# Patient Record
Sex: FEMALE | Race: WHITE | NOT HISPANIC OR LATINO
[De-identification: names, ages, dates, MRNs, and addresses within clinical notes are randomized per-mention and may not be internally consistent; named-entity substitution may affect disease eponyms.]

---

## 2022-09-21 ENCOUNTER — APPOINTMENT (OUTPATIENT)
Dept: SURGICAL ONCOLOGY | Facility: CLINIC | Age: 52
End: 2022-09-21

## 2022-09-21 ENCOUNTER — NON-APPOINTMENT (OUTPATIENT)
Age: 52
End: 2022-09-21

## 2022-09-21 DIAGNOSIS — N63.10 UNSPECIFIED LUMP IN THE RIGHT BREAST, UNSPECIFIED QUADRANT: ICD-10-CM

## 2022-09-21 PROBLEM — Z00.00 ENCOUNTER FOR PREVENTIVE HEALTH EXAMINATION: Status: ACTIVE | Noted: 2022-09-21

## 2022-09-21 PROCEDURE — 99204 OFFICE O/P NEW MOD 45 MIN: CPT | Mod: 95

## 2022-09-21 NOTE — PHYSICAL EXAM
[Normal] : supple, no neck mass and thyroid not enlarged [Normal Neck Lymph Nodes] : normal neck lymph nodes  [Normal Supraclavicular Lymph Nodes] : normal supraclavicular lymph nodes [Normal Groin Lymph Nodes] : normal groin lymph nodes [Normal Axillary Lymph Nodes] : normal axillary lymph nodes [Normal] : oriented to person, place and time, with appropriate affect [de-identified] : telehealth

## 2022-09-21 NOTE — HISTORY OF PRESENT ILLNESS
[de-identified] : Patient is a 51 y/o female who presents a telehealth visit. \par \par Her recent mammogram/sonogram results from 22 were discussed which showed a mass in the right upper outer quadrant posterior depth with irregular margins, likely corresponding to the 1.0 x 0.9 x 0.6 cm intramammary lymph node at 10:00 14 cmfn which is probably benign. A follow up right diagnostic mammogram and targeted right breast ultrasound is recommended in 6 months. (BI-RADS 3)\par \par PMHx: HTN controlled with Valsartan, On Synthroid for the thyroid, seasonal allergies \par Social hx: non-smoker, occasional drinker \par Denies any prior breast biopsies.\par Denies any family history of breast cancer. \par Menarche: 9, \par Previous use of birth control. Patient is now post menopausal.

## 2022-10-06 ENCOUNTER — APPOINTMENT (OUTPATIENT)
Dept: SURGICAL ONCOLOGY | Facility: CLINIC | Age: 52
End: 2022-10-06

## 2022-10-06 ENCOUNTER — RESULT REVIEW (OUTPATIENT)
Age: 52
End: 2022-10-06

## 2022-10-06 PROCEDURE — 19083 BX BREAST 1ST LESION US IMAG: CPT

## 2022-10-06 PROCEDURE — 99214 OFFICE O/P EST MOD 30 MIN: CPT | Mod: 25

## 2022-10-31 ENCOUNTER — OUTPATIENT (OUTPATIENT)
Dept: OUTPATIENT SERVICES | Facility: HOSPITAL | Age: 52
LOS: 1 days | End: 2022-10-31
Payer: COMMERCIAL

## 2022-10-31 ENCOUNTER — APPOINTMENT (OUTPATIENT)
Dept: MRI IMAGING | Facility: IMAGING CENTER | Age: 52
End: 2022-10-31

## 2022-10-31 ENCOUNTER — OUTPATIENT (OUTPATIENT)
Dept: OUTPATIENT SERVICES | Facility: HOSPITAL | Age: 52
LOS: 1 days | End: 2022-10-31

## 2022-10-31 VITALS
SYSTOLIC BLOOD PRESSURE: 130 MMHG | TEMPERATURE: 98 F | RESPIRATION RATE: 15 BRPM | HEIGHT: 64 IN | HEART RATE: 80 BPM | WEIGHT: 250 LBS | OXYGEN SATURATION: 98 % | DIASTOLIC BLOOD PRESSURE: 100 MMHG

## 2022-10-31 DIAGNOSIS — C50.911 MALIGNANT NEOPLASM OF UNSPECIFIED SITE OF RIGHT FEMALE BREAST: ICD-10-CM

## 2022-10-31 DIAGNOSIS — Z98.84 BARIATRIC SURGERY STATUS: Chronic | ICD-10-CM

## 2022-10-31 DIAGNOSIS — I10 ESSENTIAL (PRIMARY) HYPERTENSION: ICD-10-CM

## 2022-10-31 DIAGNOSIS — C50.919 MALIGNANT NEOPLASM OF UNSPECIFIED SITE OF UNSPECIFIED FEMALE BREAST: ICD-10-CM

## 2022-10-31 DIAGNOSIS — E03.9 HYPOTHYROIDISM, UNSPECIFIED: ICD-10-CM

## 2022-10-31 DIAGNOSIS — Z91.89 OTHER SPECIFIED PERSONAL RISK FACTORS, NOT ELSEWHERE CLASSIFIED: ICD-10-CM

## 2022-10-31 DIAGNOSIS — Z98.890 OTHER SPECIFIED POSTPROCEDURAL STATES: Chronic | ICD-10-CM

## 2022-10-31 LAB
ALBUMIN SERPL ELPH-MCNC: 4.4 G/DL — SIGNIFICANT CHANGE UP (ref 3.3–5)
ALP SERPL-CCNC: 91 U/L — SIGNIFICANT CHANGE UP (ref 40–120)
ALT FLD-CCNC: 36 U/L — HIGH (ref 4–33)
ANION GAP SERPL CALC-SCNC: 12 MMOL/L — SIGNIFICANT CHANGE UP (ref 7–14)
AST SERPL-CCNC: 42 U/L — HIGH (ref 4–32)
BILIRUB SERPL-MCNC: 0.6 MG/DL — SIGNIFICANT CHANGE UP (ref 0.2–1.2)
BUN SERPL-MCNC: 20 MG/DL — SIGNIFICANT CHANGE UP (ref 7–23)
CALCIUM SERPL-MCNC: 9.3 MG/DL — SIGNIFICANT CHANGE UP (ref 8.4–10.5)
CHLORIDE SERPL-SCNC: 100 MMOL/L — SIGNIFICANT CHANGE UP (ref 98–107)
CO2 SERPL-SCNC: 24 MMOL/L — SIGNIFICANT CHANGE UP (ref 22–31)
CREAT SERPL-MCNC: 0.85 MG/DL — SIGNIFICANT CHANGE UP (ref 0.5–1.3)
EGFR: 81 ML/MIN/1.73M2 — SIGNIFICANT CHANGE UP
GLUCOSE SERPL-MCNC: 96 MG/DL — SIGNIFICANT CHANGE UP (ref 70–99)
HCG UR QL: NEGATIVE — SIGNIFICANT CHANGE UP
HCT VFR BLD CALC: 44.3 % — SIGNIFICANT CHANGE UP (ref 34.5–45)
HGB BLD-MCNC: 14.6 G/DL — SIGNIFICANT CHANGE UP (ref 11.5–15.5)
MCHC RBC-ENTMCNC: 31.4 PG — SIGNIFICANT CHANGE UP (ref 27–34)
MCHC RBC-ENTMCNC: 33 GM/DL — SIGNIFICANT CHANGE UP (ref 32–36)
MCV RBC AUTO: 95.3 FL — SIGNIFICANT CHANGE UP (ref 80–100)
NRBC # BLD: 0 /100 WBCS — SIGNIFICANT CHANGE UP (ref 0–0)
NRBC # FLD: 0 K/UL — SIGNIFICANT CHANGE UP (ref 0–0)
PLATELET # BLD AUTO: 240 K/UL — SIGNIFICANT CHANGE UP (ref 150–400)
POTASSIUM SERPL-MCNC: 4.1 MMOL/L — SIGNIFICANT CHANGE UP (ref 3.5–5.3)
POTASSIUM SERPL-SCNC: 4.1 MMOL/L — SIGNIFICANT CHANGE UP (ref 3.5–5.3)
PROT SERPL-MCNC: 7.1 G/DL — SIGNIFICANT CHANGE UP (ref 6–8.3)
RBC # BLD: 4.65 M/UL — SIGNIFICANT CHANGE UP (ref 3.8–5.2)
RBC # FLD: 12.9 % — SIGNIFICANT CHANGE UP (ref 10.3–14.5)
SODIUM SERPL-SCNC: 136 MMOL/L — SIGNIFICANT CHANGE UP (ref 135–145)
WBC # BLD: 7.75 K/UL — SIGNIFICANT CHANGE UP (ref 3.8–10.5)
WBC # FLD AUTO: 7.75 K/UL — SIGNIFICANT CHANGE UP (ref 3.8–10.5)

## 2022-10-31 PROCEDURE — C8908: CPT

## 2022-10-31 PROCEDURE — C8937: CPT

## 2022-10-31 PROCEDURE — 77049 MRI BREAST C-+ W/CAD BI: CPT | Mod: 26

## 2022-10-31 PROCEDURE — 93010 ELECTROCARDIOGRAM REPORT: CPT

## 2022-10-31 PROCEDURE — A9585: CPT

## 2022-10-31 NOTE — H&P PST ADULT - PROBLEM SELECTOR PLAN 3
Patient instructed to take valsartan  with a sip of water on the morning of procedure.     EKG in chart.  Will obtain ECHO and stress results from cardiologist. Patient instructed to take valsartan  with a sip of water on the morning of procedure.     EKG in chart.  Will obtain comparison EKG, ECHO and stress results from cardiologist.    Patient with elevated /100 mmhg at PST x 2 requesting medical evaluation prior to surgery.

## 2022-10-31 NOTE — H&P PST ADULT - ASSESSMENT
52 year old female with PMH of HTN, Hypothyroidism, presents to PST, with pre op diagnosis of malignant neoplasm of right female breast, for pre op evaluation prior to scheduled surgery- Right breast lumpectomy with MAGseed localization x1 site, right axillary sentinel node biopsy with Dr Gutierrez.

## 2022-10-31 NOTE — H&P PST ADULT - NSICDXFAMILYHX_GEN_ALL_CORE_FT
FAMILY HISTORY:  Mother  Still living? Unknown  FHx: hypertension, Age at diagnosis: Age Unknown    Sibling  Still living? Unknown  FHx: hypertension, Age at diagnosis: Age Unknown

## 2022-10-31 NOTE — H&P PST ADULT - PROBLEM SELECTOR PLAN 1
Patient is tentatively scheduled for surgery- Right breast lumpectomy with MAG seed localization x1 site, right axillary sentinel node biopsy with Dr Gutierrez on 11/14/2022.    Pre-op instructions provided. Pt given verbal and written instructions with teach back on chlorhexidine wash and pepcid. Pt verbalized understanding with return demonstration.    Routine Covid PCR test ordered .Instructions regarding covid PCR test and locations for covid testing site provided. Pt verbalized understanding

## 2022-11-10 PROBLEM — E66.9 OBESITY, UNSPECIFIED: Chronic | Status: ACTIVE | Noted: 2022-10-31

## 2022-11-10 PROBLEM — E03.9 HYPOTHYROIDISM, UNSPECIFIED: Chronic | Status: ACTIVE | Noted: 2022-10-31

## 2022-11-10 PROBLEM — I10 ESSENTIAL (PRIMARY) HYPERTENSION: Chronic | Status: ACTIVE | Noted: 2022-10-31

## 2022-11-11 ENCOUNTER — NON-APPOINTMENT (OUTPATIENT)
Age: 52
End: 2022-11-11

## 2022-11-11 VITALS
DIASTOLIC BLOOD PRESSURE: 78 MMHG | OXYGEN SATURATION: 99 % | SYSTOLIC BLOOD PRESSURE: 156 MMHG | HEART RATE: 72 BPM | HEIGHT: 64 IN | WEIGHT: 250 LBS | RESPIRATION RATE: 20 BRPM | TEMPERATURE: 98 F

## 2022-11-11 NOTE — ASU PREOPERATIVE ASSESSMENT, ADULT ([facility] ONLY) - FALL HARM RISK - UNIVERSAL INTERVENTIONS
Bed in lowest position, wheels locked, appropriate side rails in place/Call bell, personal items and telephone in reach/Instruct patient to call for assistance before getting out of bed or chair/Non-slip footwear when patient is out of bed/[place] to call system/Physically safe environment - no spills, clutter or unnecessary equipment/Purposeful Proactive Rounding/Room/bathroom lighting operational, light cord in reach

## 2022-11-13 ENCOUNTER — TRANSCRIPTION ENCOUNTER (OUTPATIENT)
Age: 52
End: 2022-11-13

## 2022-11-14 ENCOUNTER — APPOINTMENT (OUTPATIENT)
Dept: SURGICAL ONCOLOGY | Facility: AMBULATORY SURGERY CENTER | Age: 52
End: 2022-11-14

## 2022-11-14 ENCOUNTER — TRANSCRIPTION ENCOUNTER (OUTPATIENT)
Age: 52
End: 2022-11-14

## 2022-11-14 ENCOUNTER — RESULT REVIEW (OUTPATIENT)
Age: 52
End: 2022-11-14

## 2022-11-14 ENCOUNTER — APPOINTMENT (OUTPATIENT)
Dept: MAMMOGRAPHY | Facility: IMAGING CENTER | Age: 52
End: 2022-11-14

## 2022-11-14 ENCOUNTER — APPOINTMENT (OUTPATIENT)
Dept: NUCLEAR MEDICINE | Facility: IMAGING CENTER | Age: 52
End: 2022-11-14

## 2022-11-14 ENCOUNTER — OUTPATIENT (OUTPATIENT)
Dept: OUTPATIENT SERVICES | Facility: HOSPITAL | Age: 52
LOS: 1 days | End: 2022-11-14
Payer: COMMERCIAL

## 2022-11-14 ENCOUNTER — OUTPATIENT (OUTPATIENT)
Dept: OUTPATIENT SERVICES | Facility: HOSPITAL | Age: 52
LOS: 1 days | Discharge: HOME | End: 2022-11-14

## 2022-11-14 VITALS
DIASTOLIC BLOOD PRESSURE: 81 MMHG | SYSTOLIC BLOOD PRESSURE: 104 MMHG | RESPIRATION RATE: 18 BRPM | HEART RATE: 94 BPM | OXYGEN SATURATION: 99 % | TEMPERATURE: 98 F

## 2022-11-14 DIAGNOSIS — Z98.890 OTHER SPECIFIED POSTPROCEDURAL STATES: Chronic | ICD-10-CM

## 2022-11-14 DIAGNOSIS — C50.911 MALIGNANT NEOPLASM OF UNSPECIFIED SITE OF RIGHT FEMALE BREAST: ICD-10-CM

## 2022-11-14 DIAGNOSIS — Z98.84 BARIATRIC SURGERY STATUS: Chronic | ICD-10-CM

## 2022-11-14 DIAGNOSIS — Z00.8 ENCOUNTER FOR OTHER GENERAL EXAMINATION: ICD-10-CM

## 2022-11-14 PROCEDURE — 14001 TIS TRNFR TRUNK 10.1-30SQCM: CPT

## 2022-11-14 PROCEDURE — 38792 RA TRACER ID OF SENTINL NODE: CPT | Mod: 59

## 2022-11-14 PROCEDURE — 38308 INCISION OF LYMPH CHANNELS: CPT

## 2022-11-14 PROCEDURE — 19281 PERQ DEVICE BREAST 1ST IMAG: CPT | Mod: RT

## 2022-11-14 PROCEDURE — 88307 TISSUE EXAM BY PATHOLOGIST: CPT | Mod: 26

## 2022-11-14 PROCEDURE — 76098 X-RAY EXAM SURGICAL SPECIMEN: CPT | Mod: 26

## 2022-11-14 PROCEDURE — 19302 P-MASTECTOMY W/LN REMOVAL: CPT

## 2022-11-14 PROCEDURE — 19281 PERQ DEVICE BREAST 1ST IMAG: CPT

## 2022-11-14 PROCEDURE — 76098 X-RAY EXAM SURGICAL SPECIMEN: CPT

## 2022-11-14 PROCEDURE — 38900 IO MAP OF SENT LYMPH NODE: CPT

## 2022-11-14 DEVICE — SURGICEL FIBRILLAR 2 X 4": Type: IMPLANTABLE DEVICE | Status: FUNCTIONAL

## 2022-11-14 DEVICE — ARISTA 3GR: Type: IMPLANTABLE DEVICE | Status: FUNCTIONAL

## 2022-11-14 RX ORDER — OXYCODONE HYDROCHLORIDE 5 MG/1
1 TABLET ORAL
Qty: 10 | Refills: 0
Start: 2022-11-14

## 2022-11-14 RX ORDER — VALSARTAN 80 MG/1
1 TABLET ORAL
Qty: 0 | Refills: 0 | DISCHARGE

## 2022-11-14 RX ORDER — LEVOTHYROXINE SODIUM 125 MCG
1 TABLET ORAL
Qty: 0 | Refills: 0 | DISCHARGE

## 2022-11-14 NOTE — ASU DISCHARGE PLAN (ADULT/PEDIATRIC) - PAIN MANAGEMENT
Prescription called to pharmacy/Take over the counter pain medication/Prescriptions electronically submitted to pharmacy from [facility]

## 2022-11-14 NOTE — ASU DISCHARGE PLAN (ADULT/PEDIATRIC) - NURSING INSTRUCTIONS
Progress to regular diet as tolerated.  Keep well hydrated.   When taking pain/narcotic medications - take with food as it can cause nausea if taken on an empty stomach, and know it may cause constipation. To prevent constipation increase fluids and fiber in diet. You may take stool softeners (such as Colace) and follow directions as printed on bottle. - Do NOT drive while on narcotics.   Next dose of Tylenol may be taken at 9pm

## 2022-11-14 NOTE — ASU DISCHARGE PLAN (ADULT/PEDIATRIC) - ASU DC SPECIAL INSTRUCTIONSFT
You can take over the counter tylenol for pain and use ice packs. Oxycodone has also been sent to your pharmacy for breakthrough pain.    Please let the steri strips fall off on their own. You can shower 24hours after surgery.    Please follow up with Dr. Gutierrez in clinic in 2 weeks.

## 2022-11-14 NOTE — ASU DISCHARGE PLAN (ADULT/PEDIATRIC) - NS MD DC FALL RISK RISK
For information on Fall & Injury Prevention, visit: [email or web address] OR  [email or web address] OR  https://www.cdc.gov/steadi/patient.html

## 2022-11-14 NOTE — BRIEF OPERATIVE NOTE - OPERATION/FINDINGS
Right sentinel lymph node biopsy with nuclear injection and right breast lumpectomy with needle localization

## 2022-11-14 NOTE — BRIEF OPERATIVE NOTE - SPECIMENS
Right axillary sentinel lymph nodes, right breast lumpectomy, surgical margins (superior, medial, inferior, lateral, deep, anterior)

## 2022-11-14 NOTE — ASU DISCHARGE PLAN (ADULT/PEDIATRIC) - CARE PROVIDER_API CALL
Leonides Gutierrez)  Surgery  [address] [place] [address]  Phone: [phone]  Fax: [phone]  Follow Up Time: 2 weeks

## 2022-11-14 NOTE — BRIEF OPERATIVE NOTE - NSICDXBRIEFPROCEDURE_GEN_ALL_CORE_FT
PROCEDURES:  Lumpectomy of right breast with needle localization 14-Nov-2022 15:38:43  Joi Weber  Lumpectomy of right breast with sentinel node biopsy 14-Nov-2022 15:39:24  Joi Weber

## 2022-11-14 NOTE — ASU PREOP CHECKLIST - HEIGHT IN CM
Section I - General Information    Name of Patient: Peter Jackson                 : 1936    Medicare #: [id number]  Transport Date: 21 (PCS is valid for round trips on this date and for all repetitive trips in the 60-day range as noted below )  Origin: [address]: [address]  Is the pt's stay covered under Medicare Part A (PPS/DRG)   []     Closest appropriate facility? If no, why is transport to more distant facility required? Yes  If hospice pt, is this transport related to pt's terminal illness? No       Section II - Medical Necessity Questionnaire  Ambulance transportation is medically necessary only if other means of transport are contraindicated or would be potentially harmful to the patient  To meet this requirement, the patient must either be "bed confined" or suffer from a condition such that transport by means other than ambulance is contraindicated by the patient's condition  The following questions must be answered by the medical professional signing below for this form to be valid:    1)  Describe the MEDICAL CONDITION (physical and/or mental) of this patient AT [address] [place] [address] that requires the patient to be transported in an ambulance and why transport by other means is contraindicated by the patient's condition: requires continue hospitalization, hosp to hosp transport    2) Is the patient "bed confined" as defined below? No  To be "be confined" the patient must satisfy all three of the following conditions: (1) unable to get up from bed without Assistance; AND (2) unable to ambulate; AND (3) unable to sit in a chair or wheelchair  3) Can this patient safely be transported by car or wheelchair van (i e , seated during transport without a medical attendant or monitoring)?    No    4) In addition to completing questions 1-3 above, please check any of the following conditions that apply*:   *Note: supporting documentation for any boxes checked must be maintained in the patient's medical records  Medical   Medical attendant required   Hemodynamic monitoring required en route     If hosp-hosp transfer, describe services needed at 2nd facility not available at 1st facility? Hematology/ Oncologist specialist not available at this facility  Acute hemolytic anemiai      Section III - Signature of Physician or Healthcare Professional  I certify that the above information is true and correct based on my evaluation of this patient, and represent that the patient requires transport by ambulance and that other forms of transport are contraindicated  I understand that this information will be used by the Centers for Medicare and Medicaid Services (CMS) to support the determination of medical necessity for ambulance services, and I represent that I have personal knowledge of the patient's condition at time of transport  []  If this box is checked, I also certify that the patient is physically or mentally incapable of signing the ambulance service's claim and that the institution with which I am affiliated has furnished care, services, or assistance to the patient  My signature below is made on behalf of the patient pursuant to 42 CFR §424 36(b)(4)   In accordance with 42 CFR §424 37, the specific reason(s) that the patient is physically or mentally incapable of signing the claim form is as follows:       Signature of Physician* or Healthcare Professional______________________________________________________________  Signature Date 09/08/21 (For scheduled repetitive transports, this form is not valid for transports performed more than 60 days after this date)    Printed Name & Credentials of Physician or Healthcare Professional (MD, DO, RN, etc )___Gladis Bautista RN_____________________________  *Form must be signed by patient's attending physician for scheduled, repetitive transports   For non-repetitive, unscheduled ambulance transports, if unable to obtain the signature of the attending physician, any of the following may sign (choose appropriate option below)  [] Physician Assistant []  Clinical Nurse Specialist []  Registered Nurse  []  Nurse Practitioner  [x] Discharge Planner [phone]

## 2022-11-17 LAB — SURGICAL PATHOLOGY STUDY: SIGNIFICANT CHANGE UP

## 2022-11-18 NOTE — ASU PREOP CHECKLIST - AS TEMP SITE
[Alert] : alert [Normal Voice/Communication] : normal voice/communication [No Acute Distress] : no acute distress [Healthy Appearing] : healthy appearing [Well Developed] : well developed [Well Nourished] : well nourished [Sclera] : the sclera and conjunctiva were normal [Hearing Threshold Finger Rub Not [place]] : hearing was normal [Normal Lips/Gums] : the lips and gums were normal [Oropharynx] : the oropharynx was normal [Normal Appearance] : the appearance of the neck was normal [No Neck Mass] : no neck mass was observed [No Respiratory Distress] : no respiratory distress [No Acc Muscle Use] : no accessory muscle use [Respiration, Rhythm And Depth] : normal respiratory rhythm and effort [Auscultation Breath Sounds / Voice Sounds] : lungs were clear to auscultation bilaterally [Heart Rate And Rhythm] : heart rate was normal and rhythm regular [Normal S1, S2] : normal S1 and S2 [Murmurs] : no murmurs [None] : no edema [Bowel Sounds] : normal bowel sounds [Abdomen Tenderness] : non-tender [No Masses] : no abdominal mass palpated forehead [Abdomen Soft] : soft [] : no hepatosplenomegaly [No CVA Tenderness] : no CVA  tenderness [Abnormal Walk] : normal gait [No Joint Swelling] : no joint swelling seen [Normal Color / Pigmentation] : normal skin color and pigmentation [Oriented To Time, Place, And Person] : oriented to person, place, and time [de-identified] : Not done.  Not indicated.

## 2022-11-29 ENCOUNTER — TRANSCRIPTION ENCOUNTER (OUTPATIENT)
Age: 52
End: 2022-11-29

## 2022-11-30 ENCOUNTER — APPOINTMENT (OUTPATIENT)
Dept: SURGICAL ONCOLOGY | Facility: CLINIC | Age: 52
End: 2022-11-30

## 2022-11-30 VITALS
HEIGHT: 64 IN | DIASTOLIC BLOOD PRESSURE: 90 MMHG | SYSTOLIC BLOOD PRESSURE: 138 MMHG | RESPIRATION RATE: 17 BRPM | BODY MASS INDEX: 41.32 KG/M2 | OXYGEN SATURATION: 98 % | WEIGHT: 242 LBS | HEART RATE: 90 BPM

## 2022-11-30 DIAGNOSIS — C50.911 MALIGNANT NEOPLASM OF UNSPECIFIED SITE OF RIGHT FEMALE BREAST: ICD-10-CM

## 2022-11-30 PROCEDURE — 99024 POSTOP FOLLOW-UP VISIT: CPT

## 2022-11-30 NOTE — HISTORY OF PRESENT ILLNESS
[de-identified] : Patient is a 53 y/o female who presents a post op visit. She is status post right breast lumpectomy and right axillary sentinel node excision on 22 for triple negative breast cancer. No complaints postop. \par \par Final pathology (22):\par 1. Lymph node, right axillary sentinel; excision:- Four lymph nodes, negative for carcinoma (0/4).\par 2. Breast, right; lumpectomy:- Invasive poorly differentiated ductal carcinoma with associated chronic inflammation, measuring 12 mm in greatest dimension, located more than 3 mm from all inked tissue edges (see parts 3-8 for final margins).\par - No lymphovascular invasion seen.\par - Fibrocystic changes with calcifications.\par - Biopsy site changes.\par - Benign skin.\par AJCC 8th staging: pT1c, (sn)pN0\par 3. Breast, right, superior margin; excision:- Benign breast tissue.\par 4. Breast, right, medial margin; excision:- Benign breast tissue.\par 5. Breast, right, inferior margin; excision:-Benign fibroadipose tissue.\par 6. Breast, right, lateral margin; excision:-Benign fibroadipose tissue.\par 7. Breast, right, deep margin; excision:-Benign fibroadipose tissue.\par 8. Breast, right, anterior margin,; excision:-Benign fibroadipose tissue.\par \par Breast MRI (10/31/22): 1.5 cm irregular enhancing mass in the far posterior lateral right breast containing a biopsy clip and corresponding to the site of biopsy-proven malignancy. Linear nonmass enhancement extends from the mass lateral to the skin, most consistent with the biopsy tract. No additional suspicious enhancing findings in the right breast and no suspicious enhancing findings in the contralateral left breast. No lymphadenopathy. (BI-RADS 6)\par \par Biopsy pathology (10/6/22):\par 1. Breast, right, upper outer quadrant, core biopsy:\par - Invasive poorly differentiated ductal carcinoma.\par - [place] score 9/9 (3 + 3 + 3) in this limited material.\par - Invasive tumor measures at least 8 mm.\par - Ductal carcinoma in situ is not present.\par - Microcalcifications are present\par - Lymphovascular permeation by tumor is seen.\par - ER-, [place]-, HER-2 negative by [facility] \par \par \par Mammogram/sonogram results from 22 were discussed which showed a mass in the right upper outer quadrant posterior depth with irregular margins, likely corresponding to the 1.0 x 0.9 x 0.6 cm intramammary lymph node at 10:00 14 cmfn which is probably benign. A follow up right diagnostic mammogram and targeted right breast ultrasound is recommended in 6 months. (BI-RADS 3)\par \par PMHx: HTN controlled with Valsartan, On Synthroid for the thyroid, seasonal allergies \par Social hx: non-smoker, occasional drinker \par Denies any prior breast biopsies.\par Denies any family history of breast cancer. \par Menarche: 9, \par Previous use of birth control. Patient is now post menopausal.

## 2022-11-30 NOTE — PHYSICAL EXAM
[Normal] : supple, no neck mass and thyroid not enlarged [Normal Neck Lymph Nodes] : normal neck lymph nodes  [Normal Supraclavicular Lymph Nodes] : normal supraclavicular lymph nodes [Normal Groin Lymph Nodes] : normal groin lymph nodes [Normal Axillary Lymph Nodes] : normal axillary lymph nodes [Normal] : oriented to person, place and time, with appropriate affect [de-identified] : right lumpectomy incision healing well.  no active infection.

## 2022-11-30 NOTE — ASSESSMENT
[FreeTextEntry1] : Triple negative stage 1 right breast cancer\par S/p right lumpectomy - lymph nodes and margins negative on final pathology \par Normal postoperative course \par Will refer to radiation and medical oncology to discuss treatment options \par RTO 3 months \par Genetic testing performed today \par \par \par \par \par Enclosed pathology report\par \par

## 2022-11-30 NOTE — ADDENDUM
[FreeTextEntry1] : I, Magalis Chance, acted solely as a scribe for Dr. Leonides Gutierrez on this date 11/30/2022.\par

## 2023-03-15 NOTE — PHYSICAL EXAM
[Normal] : supple, no neck mass and thyroid not enlarged [Normal Neck Lymph Nodes] : normal neck lymph nodes  [Normal Supraclavicular Lymph Nodes] : normal supraclavicular lymph nodes [Normal Groin Lymph Nodes] : normal groin lymph nodes [Normal Axillary Lymph Nodes] : normal axillary lymph nodes [Normal] : oriented to person, place and time, with appropriate affect [de-identified] : right lumpectomy incision healing well.  no active infection.

## 2023-03-15 NOTE — HISTORY OF PRESENT ILLNESS
[de-identified] : Patient is a 53 y/o female who presents a follow up visit. She is status post right breast lumpectomy and right axillary sentinel node excision on 22 for triple negative breast cancer. \par \par Final pathology (22):\par 1. Lymph node, right axillary sentinel; excision:- Four lymph nodes, negative for carcinoma (0/4).\par 2. Breast, right; lumpectomy:- Invasive poorly differentiated ductal carcinoma with associated chronic inflammation, measuring 12 mm in greatest dimension, located more than 3 mm from all inked tissue edges (see parts 3-8 for final margins).\par - No lymphovascular invasion seen.\par - Fibrocystic changes with calcifications.\par - Biopsy site changes.\par - Benign skin.\par AJCC 8th staging: pT1c, (sn)pN0\par 3. Breast, right, superior margin; excision:- Benign breast tissue.\par 4. Breast, right, medial margin; excision:- Benign breast tissue.\par 5. Breast, right, inferior margin; excision:-Benign fibroadipose tissue.\par 6. Breast, right, lateral margin; excision:-Benign fibroadipose tissue.\par 7. Breast, right, deep margin; excision:-Benign fibroadipose tissue.\par 8. Breast, right, anterior margin,; excision:-Benign fibroadipose tissue.\par \par Breast MRI (10/31/22): 1.5 cm irregular enhancing mass in the far posterior lateral right breast containing a biopsy clip and corresponding to the site of biopsy-proven malignancy. Linear nonmass enhancement extends from the mass lateral to the skin, most consistent with the biopsy tract. No additional suspicious enhancing findings in the right breast and no suspicious enhancing findings in the contralateral left breast. No lymphadenopathy. (BI-RADS 6)\par \par Biopsy pathology (10/6/22):\par 1. Breast, right, upper outer quadrant, core biopsy:\par - Invasive poorly differentiated ductal carcinoma.\par - [place] score 9/9 (3 + 3 + 3) in this limited material.\par - Invasive tumor measures at least 8 mm.\par - Ductal carcinoma in situ is not present.\par - Microcalcifications are present\par - Lymphovascular permeation by tumor is seen.\par - ER-, [place]-, HER-2 negative by [facility] \par \par \par Mammogram/sonogram results from 22 were discussed which showed a mass in the right upper outer quadrant posterior depth with irregular margins, likely corresponding to the 1.0 x 0.9 x 0.6 cm intramammary lymph node at 10:00 14 cmfn which is probably benign. A follow up right diagnostic mammogram and targeted right breast ultrasound is recommended in 6 months. (BI-RADS 3)\par \par PMHx: HTN controlled with Valsartan, On Synthroid for the thyroid, seasonal allergies \par Social hx: non-smoker, occasional drinker \par Denies any prior breast biopsies.\par Denies any family history of breast cancer. \par Menarche: 9, \par Previous use of birth control. Patient is now post menopausal.

## 2023-03-22 ENCOUNTER — APPOINTMENT (OUTPATIENT)
Dept: SURGICAL ONCOLOGY | Facility: CLINIC | Age: 53
End: 2023-03-22

## 2023-08-09 NOTE — ADDENDUM
Physical Therapy Discharge    Visit Type: Daily Treatment Note -  Discharge Summary  Visit: 5  Referring Provider: Josh Suarez MD  Medical Diagnosis (from order): Diagnosis Information    Diagnosis  847.0 (ICD-9-CM) - S16.1XXA (ICD-10-CM) - Cervical strain         SUBJECTIVE                                                                                                               Patient reports pain not getting better in neck at 8/10 and left shoulder is not the bad one, originally the right shoulder was the more painful one. Patient report having issues with low back as well. Patient reports doing home exercise program with some issue with movements. Patient reports having appointment scheduled on next week with pain management.      Pain / Symptoms  - Pain rating (out of 10): Current: 8       OBJECTIVE                                                                                                                     Range of Motion (ROM)   (degrees unless noted; active unless noted; norms in ( ); negative=lacking to 0, positive=beyond 0)  Cervical:  Impairment Level:     - Flexion: pain and WNL      - Extension: minimal impairment and pain      - Rotation:         • Left: pain, minimal impairment        • Right: pain, WNL    - Side bend:         • Left: pain, minimal impairment        • Right: pain, WNL    Strength  (out of 5 unless noted, standard test position unless noted)   Cervical:    - Flexion: 3-    - Extension: 3-, pain    - Side Bend:       • Left: 4       • Right: 4    - Rotation:       • Left: pain, 3-       • Right: 4                    Outcome/Assessments  Outcome Measures:   Neck Disability Index (NDI): Neck Disability Index Score: 20  NDI Total Possible Score: 50  NDI Score Calculated: 40 %  (scored 0-100, higher score indicates higher disability) see flowsheet for additional documentation        Treatment     Therapeutic Exercise  Chin tuck with towel 10x 5 sec -5 /10 cervical spine   [FreeTextEntry1] : I, Magalis Chance, acted solely as a scribe for Dr. Leonides Gutierrez on this date 03/22/2023.\par    Suboccipital release 1 minute   active range of motion cervical flexion extension 10x   active range of motion cervical rotation Left /right   active range of motion sidebend   Upper trap stretch supine 3x 20 seconds   Scalene stretch in supine 3 x 20 seconds   Shoulder flexion #1 10x each arm 3x   upper body erogometer 4 minutes forward and 2 minutes backward due to pain increase     Skilled input: verbal instruction/cues and tactile instruction/cues    Writer verbally educated and received verbal consent for hand placement, positioning of patient, and techniques to be performed today from patient for therapist position for techniques and hand placement and palpation for techniques as described above and how they are pertinent to the patient's plan of care.  Home Exercise Program  Access Code: [email or web address]  URL: [email or web address]  Date: 07/26/2023  Prepared by: Tristen Hughes    Exercises  - Supine Chin Tucks on Flat Ball  - 1 x daily - 7 x weekly - 3 sets - 10 reps  - Hooklying Suboccipital Release with Fingers  - 1 x daily - 7 x weekly - 3 sets - 10 reps - 5 hold  - Seated Upper Trapezius Stretch  - 1 x daily - 7 x weekly - 3 sets - 5 reps - 20 hold  - Supine Cervical Sidebending Stretch  - 1 x daily - 7 x weekly - 3 sets - 3 reps - 20 hold  - Supine Posterior Scalene Stretch  - 1 x daily - 7 x weekly - 3 sets - 3 reps - 20 hold  - Seated Cervical Flexion Stretch with Finger Support Behind Neck  - 1 x daily - 7 x weekly - 3 sets - 3 reps - 20 hold  - Seated Passive Cervical Retraction  - 3-4 x daily - 7 x weekly - 1 sets - 20 reps - 5 hold  - Seated Cervical Flexion AROM  - 1 x daily - 7 x weekly - 3 sets - 10 reps  - Seated Cervical Sidebending AROM  - 1 x daily - 7 x weekly - 3 sets - 10 reps  - Seated Cervical Rotation AROM  - 1 x daily - 7 x weekly - 3 sets - 10 reps  - Single Arm Cabinet Reach Flexion Shoulder Height  - 1 x daily - 7 x weekly - 3 sets - 10 reps  - Seated Scapular  Retraction  - 1 x daily - 7 x weekly - 3 sets - 10 reps  - Seated Shoulder Horizontal Abduction with Resistance  - 1 x daily - 7 x weekly - 3 sets - 10 reps  - Ulnar Nerve Flossing  - 1 x daily - 7 x weekly - 3 sets - 10 reps        ASSESSMENT                                                                                                          To date the patient has made gains not as expected due to Continue moderate to severe pain status .    Patient demonstrated good tolerance for PT session today. Patient has not met all goals pertaining to PT deficits since evaluation and patient is in agreement with discharge to refer back to pain management and referring doctor. Patient demonstrated continue normal to minimal limitations in active range of motion in cervical spine with severe pain. Patient demonstrated weakness still present in cervical spine with pain in multiple directions. Gradual pain decrease with exercises and stretches during session.  Increased difficulty with lifting left upper extremity with light weight and still reports difficulty with sleeping at night.  Patient encouraged to continue home exercise program based on need. Patient in agreement with continuing home exercise program as prescribed for self management and will refer to referring doctor for further consult on all remaining deficits.     Pain/symptoms after session (out of 10): 5  Education:   - Results of above outlined education: Verbalizes understanding and Demonstrates understanding    PLAN                                                                                                                           Discharge from skilled therapy with instructions/recommendations to: continue home exercise program, follow up with referring provider    Suggestions for next session as indicated: Discharge       Goals  Long Term Goals: to be met by end of plan of care  1. Pt will demonstrate increased strength on MMT by 4/5 with minimal  pain in cervical to improve functional activities involved in ADLs/IADLs.      Status: not met See strength   2.  Pt will demonstrate simulation of activities such as lifting light weight, laying in bed, and raising light weight to high shelf with proper body mechanics with minimal pain in B UEs or cervical spine  to indicate improve functional strength to return prior level of function.      Status: not metPatient still having trouble sleeping , lifting light weight due to severe pain in cervical spine and bilateral shoulders   3. Pt will demonstrate increased flexibility in cervical spine musculature with minimal pain in cervical spine to indicate improve active range of motion of cervical spine for actvities of daily living / instrumental activities of daily living .    Status: not met Patient still demonstrates tightness in musculature of cervical spine and moderate pain and radicular symptoms with palpation at 6/10   4. Pt will demonstrate compliance with home exercise program  to increase active range of motion , improve strength, and decrease pain status to increase self management independently at home.     Status: met   5. Pt will demonstrate improved scores on Neck Disability Index  by 10 points or more to indicate increased cervical spine function with actvities of daily living . Status: progressing/ongoing      Therapy procedure time and total treatment time can be found documented on the Time Entry flowsheet     DISPLAY PLAN FREE TEXT DISPLAY PLAN FREE TEXT DISPLAY PLAN FREE TEXT DISPLAY PLAN FREE TEXT DISPLAY PLAN FREE TEXT

## (undated) DEVICE — WARMING BLANKET LOWER ADULT

## (undated) DEVICE — DRSG STERISTRIPS 0.5 X 4"

## (undated) DEVICE — LAP PAD W RING 18 X 18"

## (undated) DEVICE — DRSG TEGADERM 4X4.75"

## (undated) DEVICE — GLV 7.5 PROTEXIS (WHITE)

## (undated) DEVICE — SUT MONOCRYL 3-0 18" PS-2 UNDYED

## (undated) DEVICE — SOL IRR POUR NS 0.9% 500ML

## (undated) DEVICE — RADIOGRAPHY DVC SPEC TRANSPEC

## (undated) DEVICE — ELCTR BOVIE TIP BLADE INSULATED 2.75" EDGE

## (undated) DEVICE — DRAPE CHEST BREAST 106" X 122"

## (undated) DEVICE — ELCTR GROUNDING PAD ADULT COVIDIEN

## (undated) DEVICE — POSITIONER STRAP ARMBOARD VELCRO TS-30

## (undated) DEVICE — VENODYNE/SCD SLEEVE CALF MEDIUM

## (undated) DEVICE — ELCTR BOVIE PENCIL SMOKE EVACUATION

## (undated) DEVICE — GOWN LG

## (undated) DEVICE — LIGASURE SMALL JAW

## (undated) DEVICE — SUT VICRYL 3-0 27" SH UNDYED

## (undated) DEVICE — GLV 7 PROTEXIS (WHITE)

## (undated) DEVICE — SUT SILK 2-0 30" SH

## (undated) DEVICE — SUT MONOCRYL 4-0 27" PS-2 UNDYED